# Patient Record
Sex: FEMALE | Race: ASIAN | NOT HISPANIC OR LATINO | URBAN - METROPOLITAN AREA
[De-identification: names, ages, dates, MRNs, and addresses within clinical notes are randomized per-mention and may not be internally consistent; named-entity substitution may affect disease eponyms.]

---

## 2021-01-22 ENCOUNTER — OFFICE VISIT (OUTPATIENT)
Dept: URBAN - METROPOLITAN AREA CLINIC 63 | Facility: CLINIC | Age: 56
End: 2021-01-22

## 2021-02-05 LAB
BCP MUTATIONS: DETECTED
HBV GENOTYPE:: (no result)
HEPATITIS B VIRUS DNA: (no result)
HEPATITIS BE ANTIBODY: REACTIVE
HEPATITIS BE ANTIGEN: NONREACTIVE
POLYMERASE MUTATIONS:: NOT DETECTED
PRECORE MUTATION: NOT DETECTED

## 2021-02-11 ENCOUNTER — OFFICE VISIT (OUTPATIENT)
Dept: URBAN - METROPOLITAN AREA SURGERY CENTER 4 | Facility: SURGERY CENTER | Age: 56
End: 2021-02-11

## 2021-02-26 ENCOUNTER — OFFICE VISIT (OUTPATIENT)
Dept: URBAN - METROPOLITAN AREA CLINIC 63 | Facility: CLINIC | Age: 56
End: 2021-02-26

## 2022-05-17 ENCOUNTER — OFFICE VISIT (OUTPATIENT)
Dept: URBAN - METROPOLITAN AREA CLINIC 63 | Facility: CLINIC | Age: 57
End: 2022-05-17

## 2022-05-20 LAB
A/G RATIO: 1.7
ALBUMIN: (no result)
ALKALINE PHOSPHATASE: (no result)
ALT (SGPT): (no result)
AST (SGOT): (no result)
BASO (ABSOLUTE): (no result)
BASOS: (no result)
BILIRUBIN, TOTAL: (no result)
BUN/CREATININE RATIO: 16
BUN: (no result)
CALCIUM: (no result)
CARBON DIOXIDE, TOTAL: (no result)
CHLORIDE: (no result)
CHOLESTEROL, TOTAL: (no result)
COMMENT:: (no result)
CREATININE: (no result)
EGFR: (no result)
EOS (ABSOLUTE): (no result)
EOS: (no result)
GLOBULIN, TOTAL: (no result)
GLUCOSE: (no result)
HBSAG SCREEN: POSITIVE
HBV IU/ML: (no result)
HDL CHOLESTEROL: (no result)
HEMATOCRIT: (no result)
HEMATOLOGY COMMENTS:: (no result)
HEMOGLOBIN: (no result)
HEP B CORE AB, TOT: POSITIVE
HEP B SURFACE AB, QUAL: NON REACTIVE
HEP BE AB: POSITIVE
HEP BE AG: NEGATIVE
HEPATITIS B SURF AB QUANT: (no result)
IMMATURE CELLS: (no result)
IMMATURE GRANS (ABS): (no result)
IMMATURE GRANULOCYTES: (no result)
LDL CHOL CALC (NIH): (no result)
LOG10 HBV IU/ML: (no result)
LYMPHS (ABSOLUTE): (no result)
LYMPHS: (no result)
Lab: (no result)
MCH: (no result)
MCHC: (no result)
MCV: (no result)
MONOCYTES(ABSOLUTE): (no result)
MONOCYTES: (no result)
NEUTROPHILS (ABSOLUTE): (no result)
NEUTROPHILS: (no result)
NRBC: (no result)
PLATELETS: (no result)
POTASSIUM: (no result)
PROTEIN, TOTAL: (no result)
RBC: (no result)
RDW: (no result)
SODIUM: (no result)
TRIGLYCERIDES: (no result)
VLDL CHOLESTEROL CAL: (no result)
WBC: (no result)

## 2022-06-17 ENCOUNTER — OFFICE VISIT (OUTPATIENT)
Dept: URBAN - METROPOLITAN AREA CLINIC 63 | Facility: CLINIC | Age: 57
End: 2022-06-17

## 2022-07-09 ENCOUNTER — TELEPHONE ENCOUNTER (OUTPATIENT)
Dept: URBAN - METROPOLITAN AREA CLINIC 121 | Facility: CLINIC | Age: 57
End: 2022-07-09

## 2022-07-10 ENCOUNTER — TELEPHONE ENCOUNTER (OUTPATIENT)
Dept: URBAN - METROPOLITAN AREA CLINIC 121 | Facility: CLINIC | Age: 57
End: 2022-07-10

## 2022-07-10 RX ORDER — MONTELUKAST 10 MG/1
TABLET, FILM COATED ORAL ONCE A DAY
Refills: 0 | Status: ACTIVE | COMMUNITY
Start: 2022-06-17

## 2025-06-12 ENCOUNTER — LAB OUTSIDE AN ENCOUNTER (OUTPATIENT)
Dept: URBAN - METROPOLITAN AREA CLINIC 63 | Facility: CLINIC | Age: 60
End: 2025-06-12

## 2025-06-12 ENCOUNTER — DASHBOARD ENCOUNTERS (OUTPATIENT)
Age: 60
End: 2025-06-12

## 2025-06-12 ENCOUNTER — OFFICE VISIT (OUTPATIENT)
Dept: URBAN - METROPOLITAN AREA CLINIC 63 | Facility: CLINIC | Age: 60
End: 2025-06-12
Payer: COMMERCIAL

## 2025-06-12 DIAGNOSIS — B18.1 CHRONIC HEPATITIS B: ICD-10-CM

## 2025-06-12 PROBLEM — 61977001: Status: ACTIVE | Noted: 2025-06-12

## 2025-06-12 PROCEDURE — 99213 OFFICE O/P EST LOW 20 MIN: CPT

## 2025-06-12 RX ORDER — MONTELUKAST 10 MG/1
TABLET, FILM COATED ORAL ONCE A DAY
Refills: 0 | Status: ACTIVE | COMMUNITY
Start: 2022-06-17

## 2025-06-12 NOTE — PHYSICAL EXAM EYES:
Conjuntivae and eyelids appear normal, Sclerae : White without injection Lionel Bowden Release of Information requests were e-mailed to the [place] [id number] [facility]'s at [email or web address] on 8/23/2022 with requests for the following releases:    Patient's e-mail address: [email or web address]    1.) [facility] - [id number] - Collateral Contact & Emergency Contact   Deanna Bowden (mother)  Tel #: [phone]    2.) [facility] - [id number] - Collateral Contact & Emergency Contact  Susy Bowden (father)  Tel #: [phone]

## 2025-06-12 NOTE — HPI-TODAY'S VISIT:
60-year-old female past medical history of chronic hepatitis B presents to the office today for follow-up.  She was last seen in 2022.  History of chronic hepatitis B, with low viral load and normal ALT.  She was advised that she should be seen every 6 months in follow-up.  Unfortunately, she has been lost about.  She reports today to reestablish care.  She is grossly asymptomatic from a GI perspective.  She is up-to-date on colonoscopy. She denies dysphagia, dyspepsia, pyrosis, abdominal pain, change in bowel habits, unintentional weight loss, melena, or hematochezia.   Last colonoscopy 2/11/2021 - Nonbleeding internal hemorrhoids - Diverticulosis in the ascending colon - No specimens collected - Repeat colonoscopy in 10 years for surveillance

## 2025-06-17 ENCOUNTER — LAB OUTSIDE AN ENCOUNTER (OUTPATIENT)
Dept: URBAN - METROPOLITAN AREA CLINIC 63 | Facility: CLINIC | Age: 60
End: 2025-06-17

## 2025-06-19 LAB
AFP, SERUM, TUMOR MARKER: 2.1
ALBUMIN: 4.5
ALKALINE PHOSPHATASE: 77
ALT (SGPT): 17
AST (SGOT): 19
BASO (ABSOLUTE): 0
BASOS: 1
BILIRUBIN, TOTAL: 0.6
BUN/CREATININE RATIO: 19
BUN: 17
CALCIUM: 9.6
CARBON DIOXIDE, TOTAL: 23
CHLORIDE: 103
CREATININE: 0.91
EGFR: 71
EOS (ABSOLUTE): 0
EOS: 0
GLOBULIN, TOTAL: 2.6
GLUCOSE: 93
HBV IU/ML: 140
HEMATOCRIT: 44.3
HEMATOLOGY COMMENTS:: (no result)
HEMOGLOBIN: 13.4
IMMATURE CELLS: (no result)
IMMATURE GRANS (ABS): 0
IMMATURE GRANULOCYTES: 0
LOG10 HBV IU/ML: 2.15
LYMPHS (ABSOLUTE): 1.4
LYMPHS: 40
MCH: 27.6
MCHC: 30.2
MCV: 91
MONOCYTES(ABSOLUTE): 0.3
MONOCYTES: 8
NEUTROPHILS (ABSOLUTE): 1.8
NEUTROPHILS: 51
NRBC: (no result)
PLATELETS: 290
POTASSIUM: 4
PROTEIN, TOTAL: 7.1
RBC: 4.86
RDW: 14.3
SODIUM: 143
TEST INFORMATION:: (no result)
WBC: 3.4

## 2025-08-11 ENCOUNTER — OFFICE VISIT (OUTPATIENT)
Dept: URBAN - METROPOLITAN AREA CLINIC 63 | Facility: CLINIC | Age: 60
End: 2025-08-11
Payer: COMMERCIAL

## 2025-08-11 DIAGNOSIS — B18.1 CHRONIC HEPATITIS B: ICD-10-CM

## 2025-08-11 PROCEDURE — 99213 OFFICE O/P EST LOW 20 MIN: CPT

## 2025-08-11 RX ORDER — MONTELUKAST 10 MG/1
TABLET, FILM COATED ORAL ONCE A DAY
Refills: 0 | Status: ACTIVE | COMMUNITY
Start: 2022-06-17